# Patient Record
Sex: FEMALE | Race: ASIAN | NOT HISPANIC OR LATINO | URBAN - METROPOLITAN AREA
[De-identification: names, ages, dates, MRNs, and addresses within clinical notes are randomized per-mention and may not be internally consistent; named-entity substitution may affect disease eponyms.]

---

## 2017-01-16 ENCOUNTER — OFFICE VISIT - HEALTHEAST (OUTPATIENT)
Dept: ENDOCRINOLOGY | Facility: CLINIC | Age: 52
End: 2017-01-16

## 2017-01-16 DIAGNOSIS — E05.90 HYPERTHYROIDISM: ICD-10-CM

## 2017-01-16 LAB
CREAT SERPL-MCNC: 0.62 MG/DL (ref 0.6–1.1)
GFR SERPL CREATININE-BSD FRML MDRD: >60 ML/MIN/1.73M2

## 2017-01-23 ENCOUNTER — COMMUNICATION - HEALTHEAST (OUTPATIENT)
Dept: ENDOCRINOLOGY | Facility: CLINIC | Age: 52
End: 2017-01-23

## 2017-05-08 ENCOUNTER — COMMUNICATION - HEALTHEAST (OUTPATIENT)
Dept: ENDOCRINOLOGY | Facility: CLINIC | Age: 52
End: 2017-05-08

## 2017-05-08 DIAGNOSIS — E05.90 HYPERTHYROIDISM: ICD-10-CM

## 2017-05-10 ENCOUNTER — AMBULATORY - HEALTHEAST (OUTPATIENT)
Dept: LAB | Facility: CLINIC | Age: 52
End: 2017-05-10

## 2017-05-10 DIAGNOSIS — E05.90 HYPERTHYROIDISM: ICD-10-CM

## 2017-05-15 ENCOUNTER — OFFICE VISIT - HEALTHEAST (OUTPATIENT)
Dept: ENDOCRINOLOGY | Facility: CLINIC | Age: 52
End: 2017-05-15

## 2017-05-15 DIAGNOSIS — E05.90 HYPERTHYROIDISM: ICD-10-CM

## 2017-06-19 ENCOUNTER — COMMUNICATION - HEALTHEAST (OUTPATIENT)
Dept: LAB | Facility: CLINIC | Age: 52
End: 2017-06-19

## 2017-06-19 DIAGNOSIS — E05.90 HYPERTHYROIDISM: ICD-10-CM

## 2017-06-26 ENCOUNTER — AMBULATORY - HEALTHEAST (OUTPATIENT)
Dept: LAB | Facility: CLINIC | Age: 52
End: 2017-06-26

## 2017-06-26 DIAGNOSIS — E05.90 HYPERTHYROIDISM: ICD-10-CM

## 2017-08-23 ENCOUNTER — COMMUNICATION - HEALTHEAST (OUTPATIENT)
Dept: ENDOCRINOLOGY | Facility: CLINIC | Age: 52
End: 2017-08-23

## 2017-08-23 DIAGNOSIS — E05.90 HYPERTHYROIDISM: ICD-10-CM

## 2017-08-24 ENCOUNTER — AMBULATORY - HEALTHEAST (OUTPATIENT)
Dept: LAB | Facility: CLINIC | Age: 52
End: 2017-08-24

## 2017-08-24 DIAGNOSIS — E05.90 HYPERTHYROIDISM: ICD-10-CM

## 2017-08-29 ENCOUNTER — OFFICE VISIT - HEALTHEAST (OUTPATIENT)
Dept: ENDOCRINOLOGY | Facility: CLINIC | Age: 52
End: 2017-08-29

## 2017-08-29 DIAGNOSIS — E05.90 HYPERTHYROIDISM: ICD-10-CM

## 2017-08-29 LAB
CREAT SERPL-MCNC: 0.77 MG/DL (ref 0.6–1.1)
GFR SERPL CREATININE-BSD FRML MDRD: >60 ML/MIN/1.73M2

## 2017-08-29 ASSESSMENT — MIFFLIN-ST. JEOR: SCORE: 1082.6

## 2017-09-05 ENCOUNTER — COMMUNICATION - HEALTHEAST (OUTPATIENT)
Dept: ENDOCRINOLOGY | Facility: CLINIC | Age: 52
End: 2017-09-05

## 2018-02-15 ENCOUNTER — COMMUNICATION - HEALTHEAST (OUTPATIENT)
Dept: ENDOCRINOLOGY | Facility: CLINIC | Age: 53
End: 2018-02-15

## 2018-02-15 DIAGNOSIS — E05.90 HYPERTHYROIDISM: ICD-10-CM

## 2018-02-22 ENCOUNTER — AMBULATORY - HEALTHEAST (OUTPATIENT)
Dept: LAB | Facility: CLINIC | Age: 53
End: 2018-02-22

## 2018-02-22 DIAGNOSIS — E05.90 HYPERTHYROIDISM: ICD-10-CM

## 2018-02-22 LAB
T3 SERPL-MCNC: 90 NG/DL (ref 45–175)
T4 FREE SERPL-MCNC: 1 NG/DL (ref 0.7–1.8)
TSH SERPL DL<=0.005 MIU/L-ACNC: 0.88 UIU/ML (ref 0.3–5)

## 2018-03-01 ENCOUNTER — OFFICE VISIT - HEALTHEAST (OUTPATIENT)
Dept: ENDOCRINOLOGY | Facility: CLINIC | Age: 53
End: 2018-03-01

## 2018-03-01 DIAGNOSIS — E05.90 HYPERTHYROIDISM: ICD-10-CM

## 2018-03-01 RX ORDER — CETIRIZINE HYDROCHLORIDE 5 MG/1
5 TABLET ORAL DAILY
Status: SHIPPED | COMMUNITY
Start: 2018-03-01

## 2018-03-01 ASSESSMENT — MIFFLIN-ST. JEOR: SCORE: 1116.62

## 2019-02-13 ENCOUNTER — AMBULATORY - HEALTHEAST (OUTPATIENT)
Dept: ENDOCRINOLOGY | Facility: CLINIC | Age: 54
End: 2019-02-13

## 2019-02-13 DIAGNOSIS — E05.90 HYPERTHYROIDISM: ICD-10-CM

## 2019-02-28 ENCOUNTER — AMBULATORY - HEALTHEAST (OUTPATIENT)
Dept: LAB | Facility: CLINIC | Age: 54
End: 2019-02-28

## 2019-02-28 DIAGNOSIS — E05.90 HYPERTHYROIDISM: ICD-10-CM

## 2019-02-28 LAB
T3 SERPL-MCNC: 86 NG/DL (ref 45–175)
T4 FREE SERPL-MCNC: 0.9 NG/DL (ref 0.7–1.8)
TSH SERPL DL<=0.005 MIU/L-ACNC: 1.12 UIU/ML (ref 0.3–5)

## 2019-03-22 ENCOUNTER — COMMUNICATION - HEALTHEAST (OUTPATIENT)
Dept: ENDOCRINOLOGY | Facility: CLINIC | Age: 54
End: 2019-03-22

## 2021-05-26 NOTE — TELEPHONE ENCOUNTER
----- Message from Rock Bravo MD sent at 3/15/2019  9:52 AM CDT -----  Thyroid function is normal off tapazole . Check tsh, ft4 once a year with PCP. Follow up with PCP.

## 2021-05-30 VITALS — BODY MASS INDEX: 18.71 KG/M2 | WEIGHT: 99 LBS

## 2021-05-31 VITALS — HEIGHT: 61 IN | BODY MASS INDEX: 22.69 KG/M2 | WEIGHT: 120.2 LBS

## 2021-05-31 VITALS — WEIGHT: 127.7 LBS | HEIGHT: 61 IN | BODY MASS INDEX: 24.11 KG/M2

## 2021-06-08 NOTE — PROGRESS NOTES
"Progress Note    Reason for Visit:  Chief Complaint     Hyperthyroidism          Progress Note:    HPI:       Janice Bennett MD         This patient is seen in consultation at the request of  because of hyperthyroidism thank you for referring this pleasant 51-year-old female patient was back in August started to notice symptoms of fatigue tiredness weight loss of 9 pounds\" he intolerance.    Her thyroid function was checked the sagittal undetectable free T4 1 0.6.    Patient does not smoke or drink no family history of thyroid disorder she is  with 1 child she also had diarrhea tremors.  The patient is currently on propranolol 60 mg once a day.    She has no previous eye disease thyroid exam shows that the thyroid is minimally palpable.            Component      Latest Ref Rng 7/25/2016 8/23/2016   Creatinine      0.60 - 1.10 mg/dL 0.59 (L)    GFR MDRD Af Amer      >60 mL/min/1.73m2 >60    GFR MDRD Non Af Amer      >60 mL/min/1.73m2 >60    Bilirubin, Total      0.0 - 1.0 mg/dL 0.6    Calcium      8.5 - 10.5 mg/dL 9.5    Protein, Total      6.0 - 8.0 g/dL 7.5    ALBUMIN      3.5 - 5.0 g/dL 3.8    Alkaline Phosphatase      45 - 120 U/L 79    AST      0 - 40 U/L 20    ALT      0 - 45 U/L 29    Triglycerides      <=149 mg/dL 118    Cholesterol      <=199 mg/dL 119    LDL Calculated      <=129 mg/dL 58    HDL Cholesterol      >=50 mg/dL 37 (L)    Patient Fasting > 8hrs?       Yes    TSH      0.30 - 5.00 uIU/mL 0.03 (L) <0.01 (L)   Vitamin D, Total (25-Hydroxy)      30.0 - 80.0 ng/mL 51.2    Free T4      0.7 - 1.8 ng/dL  1.6   T3, Free      1.9 - 3.9 pg/mL  5.6 (H)   Thyroglobulin Antibody, Serum      <4.0 IU/mL  4.2 (H)       Review of Systems:    Nervous System: No headache, dizziness, fainting or memory loss. No tingling sensation of hand or feet.  Ears: No hearing loss or ringing in the ears  Eyes: No blurring of vision, redness, itching or dryness.  Nose: No nosebleed or loss of smell  Mouth: " No mouth sores or loss of taste  Throat: No hoarseness or difficulty swallowing  Neck: No enlarged thyroid or lymph nodes.  Heart: No chest pain, palpitation or irregular heartbeat. No swelling of hands or feet  Lungs: No shortness of breath, cough, night sweats, wheezing or hemoptysis.  Gastrointestinal: No nausea or vomiting, constipation or diarrhea.  No acid reflux, abdominal pain or blood in stools.  Kidney/Bladdr: No polyuria, polydipsia, nocturia or hematuria.  Genital/Sexual: No loss of libido  Skin: No rash, hair loss or hirsutism.  No abnormal striae  Muscles/Joints/Bones: No morning stiffness, muscle aches and pain or loss of height.    Current Medications:  Current Outpatient Prescriptions   Medication Sig     cetirizine (ZYRTEC) 10 MG tablet Take 1 tablet (10 mg total) by mouth daily.     propranolol (INDERAL LA) 60 mg 24 hr capsule Take 1 capsule (60 mg total) by mouth daily.       Patients Active Problems:  Patient Active Problem List   Diagnosis     Headache     Low back pain     Abnormal thyroid function test     Hyperthyroidism       History:   reports that she has never smoked. She does not have any smokeless tobacco history on file.   reports that she has never smoked. She does not have any smokeless tobacco history on file. Her alcohol and drug histories are not on file.  History   Smoking Status     Never Smoker   Smokeless Tobacco     Not on file      reports that she has never smoked. She does not have any smokeless tobacco history on file. Her alcohol and drug histories are not on file.  History   Sexual Activity     Sexual activity: Not on file     No past medical history on file.  Family History   Problem Relation Age of Onset     Pancreatic cancer Mother      Hypertension Father      No past medical history on file.  Past Surgical History   Procedure Laterality Date     Appendectomy       Tonsillectomy         Vitals   weight is 99 lb (44.9 kg) (abnormal).         Exam  General  appearance: The patient looked well, not in acute distress.  Eyes: no evidence of thyroid eye disease.   Retinal exam: No evidence of diabetic retinopathy.  Mouth and Throat: Normal  Neck: No evidence of thyromegaly, enlarged lymph node or tenderness  Chest: Trachea is central. Chest is clear to auscultation and percussion. Breat sounds are normal.  Cardiovascular exam: JVP is not raised. Heart sounds are normal, no murmurs or rub  Peripheral pulses are palpable.   Abdomen: No masses or tenderness.    Back: No vertebral tenderness or kyphosis.  Extremities: No evidence of leg edema.   Skin: Normal to touch.  No abnormal striae  Neurologic exam:  Visual fields are intact by confrontation, grossly intact. No evidence of peripheral neuropathy.  Detailed foot exam normal.        Diagnosis:  No diagnosis found.    Orders:   No orders of the defined types were placed in this encounter.        Assessment and Plan:    Hyperthyroidism I have discussed the benefits of the disease was the patient I discussed with the patient different modalities of treatment including radioactive iodine treatment Tapazole and surgery the patient opted to go with Tapazole.  I wouldn't prescribe for her 10 mg once a day I did advise her to continue with propranolol.  I discussed side effects of episode including agranulocytosis I did advise her to stop medication if she had any fever or severe sore throat.    Patient return to clinic in 4 month was labs before we will check labs today and adjust the dose accordingly but the patient said that she is still very symptomatic.    I did spent 45 minutes with the patient more than 50% was spent on counseling and managing her care.

## 2021-06-10 NOTE — PROGRESS NOTES
Progress Note    Reason for Visit:      Progress Note:    HPI:    This pleasant 51-year-old female patient is here for follow-up because of hyperthyroidism.    She is currently on Tapazole 10 mg once a day.    Component      Latest Ref Rng & Units 1/16/2017 5/10/2017   Creatinine      0.60 - 1.10 mg/dL 0.62    GFR MDRD Af Amer      >60 mL/min/1.73m2 >60    GFR MDRD Non Af Amer      >60 mL/min/1.73m2 >60    TSH      0.30 - 5.00 uIU/mL <0.01 (L) 1.07   Free T4      0.7 - 1.8 ng/dL 2.1 (H) 0.7   Potassium      3.5 - 5.0 mmol/L 3.8    T3, Total      45 - 175 ng/dL 162 82       Review of Systems:    Nervous System: No headache, dizziness, fainting or memory loss. No tingling sensation of hand or feet.  Ears: No hearing loss or ringing in the ears  Eyes: No blurring of vision, redness, itching or dryness.  Nose: No nosebleed or loss of smell  Mouth: No mouth sores or loss of taste  Throat: No hoarseness or difficulty swallowing  Neck: No enlarged thyroid or lymph nodes.  Heart: No chest pain, palpitation or irregular heartbeat. No swelling of hands or feet  Lungs: No shortness of breath, cough, night sweats, wheezing or hemoptysis.  Gastrointestinal: No nausea or vomiting, constipation or diarrhea.  No acid reflux, abdominal pain or blood in stools.  Kidney/Bladdr: No polyuria, polydipsia, nocturia or hematuria.  Genital/Sexual: No loss of libido  Skin: No rash, hair loss or hirsutism.  No abnormal striae  Muscles/Joints/Bones: No morning stiffness, muscle aches and pain or loss of height.    Current Medications:  Current Outpatient Prescriptions   Medication Sig     cetirizine (ZYRTEC) 10 MG tablet Take 1 tablet (10 mg total) by mouth daily.     methIMAzole (TAPAZOLE) 10 MG tablet Take 1 tablet (10 mg total) by mouth daily.     propranolol (INDERAL LA) 60 mg 24 hr capsule Take 1 capsule (60 mg total) by mouth daily.       Patients Active Problems:  Patient Active Problem List   Diagnosis     Headache     Low back pain      Abnormal thyroid function test     Hyperthyroidism       History:   reports that she has never smoked. She does not have any smokeless tobacco history on file.   reports that she has never smoked. She does not have any smokeless tobacco history on file. Her alcohol and drug histories are not on file.  History   Smoking Status     Never Smoker   Smokeless Tobacco     Not on file      reports that she has never smoked. She does not have any smokeless tobacco history on file. Her alcohol and drug histories are not on file.  History   Sexual Activity     Sexual activity: Not on file     No past medical history on file.  Family History   Problem Relation Age of Onset     Pancreatic cancer Mother      Hypertension Father      No past medical history on file.  Past Surgical History:   Procedure Laterality Date     APPENDECTOMY       TONSILLECTOMY         Vitals   vitals were not taken for this visit.        Exam  General appearance: The patient looked well, not in acute distress.  Eyes: no evidence of thyroid eye disease.   Retinal exam: No evidence of diabetic retinopathy.  Mouth and Throat: Normal  Neck: No evidence of thyromegaly, enlarged lymph node or tenderness  Chest: Trachea is central. Chest is clear to auscultation and percussion. Breat sounds are normal.  Cardiovascular exam: JVP is not raised. Heart sounds are normal, no murmurs or rub  Peripheral pulses are palpable.   Abdomen: No masses or tenderness.    Back: No vertebral tenderness or kyphosis.  Extremities: No evidence of leg edema.   Skin: Normal to touch.  No abnormal striae  Neurologic exam:  Visual fields are intact by confrontation, grossly intact. No evidence of peripheral neuropathy.  Detailed foot exam normal.        Diagnosis:  No diagnosis found.    Orders:   No orders of the defined types were placed in this encounter.        Assessment and Plan:  Hyperthyroidism.    The patient is currently on Tapazole 10 mg once a day.  TSH recovered from  being undetectable to, 1.07 free T4 2. 1-0 point2.1-0.7 total T3 160 2-82.    The patient is asymptomatic she feels better she has not taken any other medicine no bet I did advise the patient to decrease the dose to, 5 mg once a day we will check her thyroid function every 6 weeks would see her again in 3 months if the thyroid function is normal we will probably will continue on the Tapazole for a total of 6 month.    I did spend 25 minutes with the patient more than 50% was spent on counseling and managing her care.  A-blocker.

## 2021-06-12 NOTE — PROGRESS NOTES
Progress Note    Reason for Visit:  Chief Complaint     Hyperthyroidism          Progress Note:    HPI:    This pleasant 52-year-old female patient is here for follow-up because of hypothyroidism.    Component      Latest Ref Rng & Units 5/10/2017 6/26/2017 8/24/2017   GFR MDRD Non Af Amer      >60 mL/min/1.73m2      TSH      0.30 - 5.00 uIU/mL 1.07 4.62 4.58   Free T4      0.7 - 1.8 ng/dL 0.7 0.7 0.7   Potassium      3.5 - 5.0 mmol/L      T3, Total      45 - 175 ng/dL 82 84 66         Review of Systems:    Nervous System: No headache, dizziness, fainting or memory loss. No tingling sensation of hand or feet.  Ears: No hearing loss or ringing in the ears  Eyes: No blurring of vision, redness, itching or dryness.  Nose: No nosebleed or loss of smell  Mouth: No mouth sores or loss of taste  Throat: No hoarseness or difficulty swallowing  Neck: No enlarged thyroid or lymph nodes.  Heart: No chest pain, palpitation or irregular heartbeat. No swelling of hands or feet  Lungs: No shortness of breath, cough, night sweats, wheezing or hemoptysis.  Gastrointestinal: No nausea or vomiting, constipation or diarrhea.  No acid reflux, abdominal pain or blood in stools.  Kidney/Bladdr: No polyuria, polydipsia, nocturia or hematuria.  Genital/Sexual: No loss of libido  Skin: No rash, hair loss or hirsutism.  No abnormal striae  Muscles/Joints/Bones: No morning stiffness, muscle aches and pain or loss of height.    Current Medications:  Current Outpatient Prescriptions   Medication Sig     methIMAzole (TAPAZOLE) 5 MG tablet Take Tapazole 5 mg once a day     MULTIVITAMIN ORAL Take 1 tablet by mouth daily.       Patients Active Problems:  Patient Active Problem List   Diagnosis     Headache     Low back pain     Abnormal thyroid function test     Hyperthyroidism       History:   reports that she has never smoked. She does not have any smokeless tobacco history on file.   reports that she has never smoked. She does not have any  "smokeless tobacco history on file. Her alcohol and drug histories are not on file.  History   Smoking Status     Never Smoker   Smokeless Tobacco     Not on file      reports that she has never smoked. She does not have any smokeless tobacco history on file. Her alcohol and drug histories are not on file.  History   Sexual Activity     Sexual activity: Not on file     No past medical history on file.  Family History   Problem Relation Age of Onset     Pancreatic cancer Mother      Hypertension Father      No past medical history on file.  Past Surgical History:   Procedure Laterality Date     APPENDECTOMY       TONSILLECTOMY         Vitals   height is 5' 1\" (1.549 m) and weight is 120 lb 3.2 oz (54.5 kg). Her blood pressure is 98/64.         Exam  General appearance: The patient looked well, not in acute distress.  Eyes: no evidence of thyroid eye disease.   Retinal exam: No evidence of diabetic retinopathy.  Mouth and Throat: Normal  Neck: No evidence of thyromegaly, enlarged lymph node or tenderness  Chest: Trachea is central. Chest is clear to auscultation and percussion. Breat sounds are normal.  Cardiovascular exam: JVP is not raised. Heart sounds are normal, no murmurs or rub  Peripheral pulses are palpable.   Abdomen: No masses or tenderness.    Back: No vertebral tenderness or kyphosis.  Extremities: No evidence of leg edema.   Skin: Normal to touch.  No abnormal striae  Neurologic exam:  Visual fields are intact by confrontation, grossly intact. No evidence of peripheral neuropathy.  Detailed foot exam normal.        Diagnosis:  No diagnosis found.    Orders:   No orders of the defined types were placed in this encounter.        Assessment and Plan:  Hyperthyroidism the patient is currently on Tapazole 5 mg daily TSH went up to 4.58 free T4 0.7 total T3 66.    She has good energy level I did advise her to discontinue Tapazole will check her thyroid function every 3 month.    She has been complaining of " darkening of that her lips I will check her serum cortisol electrolytes.  The did advise her to follow-up with primary care physician.    Patient will return to clinic in 6 months I did spend 40 minutes with the patient more than 50% was spent on counseling and managing her care.

## 2021-06-16 NOTE — PROGRESS NOTES
Progress Note    Reason for Visit:  Chief Complaint     Thyroid Problem          Progress Note:    HPI:     This patient is here for follow-up because of hyperthyroidism.    Component      Latest Ref Rng & Units 8/24/2017 8/29/2017 2/22/2018   Creatinine      0.60 - 1.10 mg/dL  0.77    GFR MDRD Af Amer      >60 mL/min/1.73m2  >60    GFR MDRD Non Af Amer      >60 mL/min/1.73m2  >60    TSH      0.30 - 5.00 uIU/mL 4.58  0.88   Free T4      0.7 - 1.8 ng/dL 0.7  1.0   T3, Total      45 - 175 ng/dL 66  90   Cortisol      ug/dL  7.0    Adrenocorticotropic Hormone      pg/mL  34        Review of Systems:    Nervous System: No headache, dizziness, fainting or memory loss. No tingling sensation of hand or feet.  Ears: No hearing loss or ringing in the ears  Eyes: No blurring of vision, redness, itching or dryness.  Nose: No nosebleed or loss of smell  Mouth: No mouth sores or loss of taste  Throat: No hoarseness or difficulty swallowing  Neck: No enlarged thyroid or lymph nodes.  Heart: No chest pain, palpitation or irregular heartbeat. No swelling of hands or feet  Lungs: No shortness of breath, cough, night sweats, wheezing or hemoptysis.  Gastrointestinal: No nausea or vomiting, constipation or diarrhea.  No acid reflux, abdominal pain or blood in stools.  Kidney/Bladdr: No polyuria, polydipsia, nocturia or hematuria.  Genital/Sexual: No loss of libido  Skin: No rash, hair loss or hirsutism.  No abnormal striae  Muscles/Joints/Bones: No morning stiffness, muscle aches and pain or loss of height.    Current Medications:  Current Outpatient Prescriptions   Medication Sig     cetirizine (ZYRTEC) 5 MG tablet Take 5 mg by mouth daily.     GLUCOSAMINE SULFATE (GLUCOSAMINE ORAL) Take by mouth.     MULTIVITAMIN ORAL Take 1 tablet by mouth daily.       Patients Active Problems:  Patient Active Problem List   Diagnosis     Headache     Low back pain     Abnormal thyroid function test     Hyperthyroidism       History:   reports that  "she has never smoked. She does not have any smokeless tobacco history on file.   reports that she has never smoked. She does not have any smokeless tobacco history on file. Her alcohol and drug histories are not on file.  History   Smoking Status     Never Smoker   Smokeless Tobacco     Not on file      reports that she has never smoked. She does not have any smokeless tobacco history on file. Her alcohol and drug histories are not on file.  History   Sexual Activity     Sexual activity: Not on file     No past medical history on file.  Family History   Problem Relation Age of Onset     Pancreatic cancer Mother      Hypertension Father      No past medical history on file.  Past Surgical History:   Procedure Laterality Date     APPENDECTOMY       TONSILLECTOMY         Vitals   height is 5' 1\" (1.549 m) and weight is 127 lb 11.2 oz (57.9 kg). Her blood pressure is 100/58.         Exam  General appearance: The patient looked well, not in acute distress.  Eyes: no evidence of thyroid eye disease.   Retinal exam: No evidence of diabetic retinopathy.  Mouth and Throat: Normal  Neck: No evidence of thyromegaly, enlarged lymph node or tenderness  Chest: Trachea is central. Chest is clear to auscultation and percussion. Breat sounds are normal.  Cardiovascular exam: JVP is not raised. Heart sounds are normal, no murmurs or rub  Peripheral pulses are palpable.   Abdomen: No masses or tenderness.    Back: No vertebral tenderness or kyphosis.  Extremities: No evidence of leg edema.   Skin: Normal to touch.  No abnormal striae  Neurologic exam:  Visual fields are intact by confrontation, grossly intact. No evidence of peripheral neuropathy.  Detailed foot exam normal.        Diagnosis:  No diagnosis found.    Orders:   No orders of the defined types were placed in this encounter.        Assessment and Plan: Hyperthyroidism.  The patient was on Tapazole.  I did stop that.  TSH went down from 4.58-0.88 free T4 went up from " 0.7-1.    The patient is feeling fine.    She has complained of some darkening of her lips serum cortisol I checked that it 7 ACTH 34 otherwise she is having no other darkening of her skin or pigmentation she has no nausea or vomiting or abdominal discomfort.    Thyroid function is normal.    I did advise the patient to take vitamin D 2000 units daily over-the-counter.    She will return to clinic in 1 year and will check her thyroid function in a year.    I have reviewed and ordered clinical lab test    I have reviewed and ordered radiology tests.    I have reviewed and ordered her medication as required.    I have reviewed her test results and advised with the performing physician.    I have reviewed the patient's old records.    I have reviewed and summarized the patient old records.    I did spend 25 minutes with the patient more than 50% was spent on counseling and managing her care.